# Patient Record
Sex: MALE | Race: WHITE | NOT HISPANIC OR LATINO | Employment: FULL TIME | URBAN - METROPOLITAN AREA
[De-identification: names, ages, dates, MRNs, and addresses within clinical notes are randomized per-mention and may not be internally consistent; named-entity substitution may affect disease eponyms.]

---

## 2023-10-30 PROBLEM — H60.399 ACUTE INFECTIVE OTITIS EXTERNA: Status: ACTIVE | Noted: 2023-06-15

## 2023-10-30 RX ORDER — TIZANIDINE 2 MG/1
1-2 TABLET ORAL 2 TIMES DAILY PRN
COMMUNITY
End: 2024-03-12 | Stop reason: ALTCHOICE

## 2023-10-30 RX ORDER — MELOXICAM 7.5 MG/1
7.5 TABLET ORAL DAILY
COMMUNITY
End: 2024-03-12 | Stop reason: ALTCHOICE

## 2023-10-30 RX ORDER — OXYCODONE HYDROCHLORIDE 5 MG/1
1 TABLET ORAL EVERY 6 HOURS PRN
COMMUNITY
End: 2024-03-12 | Stop reason: ALTCHOICE

## 2023-10-30 RX ORDER — LORATADINE 10 MG/1
10 TABLET ORAL DAILY
COMMUNITY

## 2023-10-31 ENCOUNTER — OFFICE VISIT (OUTPATIENT)
Dept: GASTROENTEROLOGY | Facility: CLINIC | Age: 53
End: 2023-10-31
Payer: COMMERCIAL

## 2023-10-31 VITALS — HEART RATE: 77 BPM | BODY MASS INDEX: 26.48 KG/M2 | WEIGHT: 185 LBS | HEIGHT: 70 IN

## 2023-10-31 DIAGNOSIS — K62.5 BRIGHT RED BLOOD PER RECTUM: ICD-10-CM

## 2023-10-31 DIAGNOSIS — K64.9 HEMORRHOIDS, UNSPECIFIED HEMORRHOID TYPE: ICD-10-CM

## 2023-10-31 DIAGNOSIS — K62.5 RECTAL BLEEDING: Primary | ICD-10-CM

## 2023-10-31 PROCEDURE — 99204 OFFICE O/P NEW MOD 45 MIN: CPT | Performed by: INTERNAL MEDICINE

## 2023-10-31 PROCEDURE — 1036F TOBACCO NON-USER: CPT | Performed by: INTERNAL MEDICINE

## 2023-10-31 RX ORDER — POLYETHYLENE GLYCOL 3350, SODIUM SULFATE ANHYDROUS, SODIUM BICARBONATE, SODIUM CHLORIDE, POTASSIUM CHLORIDE 236; 22.74; 6.74; 5.86; 2.97 G/4L; G/4L; G/4L; G/4L; G/4L
4 POWDER, FOR SOLUTION ORAL ONCE
Qty: 4000 ML | Refills: 0 | Status: SHIPPED | OUTPATIENT
Start: 2023-10-31 | End: 2023-10-31

## 2023-10-31 ASSESSMENT — ENCOUNTER SYMPTOMS: SHORTNESS OF BREATH: 0

## 2023-10-31 NOTE — PROGRESS NOTES
REASON FOR VISIT:  Rectal bleeding  PCP (requesting provider): Kenny Ojeda PA-C.    HPI:  Deshawn Love is a 53 y.o. male being evaluated for rectal bleeding.    The patient reports he has never had a colonoscopy.  He notes that for the last 2-3 weeks that he has had some bright red blood per rectum.  It is a small amount when he passes gas or when he has BM.  No blood on the actual stool.  No prior blood in stool in the past.  It happened for a few days and then recurred.  He has had rectal pain since Sunday and it felt like a hemorrhoid.  He has two Bms per day.  No abdominal pain.  No NSAIDs. No issues with sedation.    PSurgHx:  -Right inguinal hernia repair     FamHx: No GI cancer.    PAST MEDICAL HISTORY  No past medical history on file.    PAST SURGICAL HISTORY  No past surgical history on file.    FAMILY HISTORY  No family history on file.    SOCIAL HISTORY   reports that he has never smoked. He has never used smokeless tobacco. He reports current alcohol use. He reports that he does not use drugs.    REVIEW OF SYSTEMS  Review of Systems   Respiratory:  Negative for shortness of breath.    Cardiovascular:  Negative for chest pain.   All other systems reviewed and are negative.    A 10+ point review of systems was otherwise negative except as noted and per HPI.    ALLERGIES  Allergies   Allergen Reactions    Aspirin Unknown    Sulfamethoxazole-Trimethoprim Rash       MEDICATIONS  Current Outpatient Medications   Medication Instructions    loratadine (CLARITIN) 10 mg, oral, Daily    meloxicam (MOBIC) 7.5 mg, oral, Daily    oxyCODONE (Roxicodone) 5 mg immediate release tablet 1 tablet, oral, Every 6 hours PRN    tiZANidine (ZANAFLEX) 1-2 mg, oral, 2 times daily PRN       VITALS  Vitals:    10/31/23 0852   Pulse: 77      Body mass index is 26.54 kg/m².    PHYSICAL EXAM  CONSTITUTIONAL: NAD, appears stated age  EYES: anicteric sclera, sclera clear  HEAD: normocephalic, atraumatic   NECK: supple   PULMONARY:  "CTAB  CARDIOVASCULAR: RRR, no M/R/G appreciated   ABDOMEN: soft, NTND, +BS, no rebound or guarding   MUSCULOSKELETAL: no edema  SKIN: no jaundice   PSYCHIATRIC: AOx3, appropriate insight and judgement  RECTAL: Greg chaperone, small to medium sized visible and palpable left-sided hemorrhoid, no fissure or fistula, clean vault with no hard palpable mass    LABS  No results found for: \"WBC\", \"HGB\", \"PLT\"  No results found for: \"NA\", \"K\", \"CL\", \"CO2\", \"BUN\", \"CREATININE\", \"CALCIUM\", \"PROT\", \"BILITOT\", \"ALKPHOS\", \"ALT\", \"AST\", \"GLUCOSE\"  No results found for: \"LIPASE\", \"CRP\"    ASSESSMENT/PLAN  Deshawn Love is a 53 y.o. male being evaluated for rectal bleeding. He did have hemorrhoids on rectal exam which is the most likely etiology.  However, he has never had a colonoscopy before and so will pursue this to ensure there is no other more insidious pathology.    -Plan for colonoscopy  -The procedure(s) including risks/benefits, diet restrictions, prep, and sedation were discussed with the patient  -Start Metamucil once daily    Follow-up will be at the time of the colonoscopy.    Signature: Edil Feliciano MD    "

## 2023-10-31 NOTE — PATIENT INSTRUCTIONS
Schedule colonoscopy.    Start Metamucil powder 1 heaping tablespoonful mixed with 8 ounces of juice or water once daily.

## 2023-11-13 ENCOUNTER — OFFICE VISIT (OUTPATIENT)
Dept: GASTROENTEROLOGY | Facility: EXTERNAL LOCATION | Age: 53
End: 2023-11-13
Payer: COMMERCIAL

## 2023-11-13 DIAGNOSIS — K92.1 HEMATOCHEZIA: ICD-10-CM

## 2023-11-13 DIAGNOSIS — K62.5 RECTAL BLEEDING: ICD-10-CM

## 2023-11-13 DIAGNOSIS — K64.4 RESIDUAL HEMORRHOIDAL SKIN TAG: Primary | ICD-10-CM

## 2023-11-13 PROCEDURE — 1036F TOBACCO NON-USER: CPT | Performed by: INTERNAL MEDICINE

## 2023-11-13 PROCEDURE — 45378 DIAGNOSTIC COLONOSCOPY: CPT | Performed by: INTERNAL MEDICINE

## 2023-11-13 NOTE — PROGRESS NOTES
This patient had a procedure(s) done at the [facility].  Please see the procedure note for full details.

## 2024-03-12 ENCOUNTER — OFFICE VISIT (OUTPATIENT)
Dept: PRIMARY CARE | Facility: CLINIC | Age: 54
End: 2024-03-12
Payer: COMMERCIAL

## 2024-03-12 VITALS
HEART RATE: 71 BPM | DIASTOLIC BLOOD PRESSURE: 66 MMHG | TEMPERATURE: 97.8 F | OXYGEN SATURATION: 98 % | SYSTOLIC BLOOD PRESSURE: 114 MMHG

## 2024-03-12 DIAGNOSIS — R05.1 ACUTE COUGH: Primary | ICD-10-CM

## 2024-03-12 DIAGNOSIS — J06.9 UPPER RESPIRATORY TRACT INFECTION, UNSPECIFIED TYPE: ICD-10-CM

## 2024-03-12 PROCEDURE — 1036F TOBACCO NON-USER: CPT | Performed by: PHYSICIAN ASSISTANT

## 2024-03-12 PROCEDURE — 99213 OFFICE O/P EST LOW 20 MIN: CPT | Performed by: PHYSICIAN ASSISTANT

## 2024-03-12 RX ORDER — ALBUTEROL SULFATE 90 UG/1
2 AEROSOL, METERED RESPIRATORY (INHALATION) EVERY 6 HOURS PRN
Qty: 18 G | Refills: 0 | Status: SHIPPED | OUTPATIENT
Start: 2024-03-12 | End: 2025-03-12

## 2024-03-12 NOTE — PROGRESS NOTES
Subjective   Patient ID: Deshawn Love is a 53 y.o. male who presents for Cough (Non-productive cough x3 weeks. ).    HPI   Patient c/o cough. Denies fever, chills, aches, shortness of breath and sore throat.   Present for 3 weeks.  Initially had mild URI symptoms including nasal drainage but that went away after a few days but dry hacking cough has persisted.  Cough: Cough is dry and hacky.  Mainly notices it if he starts talking a lot or laughing.  Denies associated diarrhea, earache and vomiting.  No sneezing.     Taking OTC Nyquil rarely and cough drops.  Uses Claritin.     Patient denies recent known COVID exposure or international travel.      reports that he has never smoked. He has never used smokeless tobacco.    Review of Systems    Objective   /66   Pulse 71   Temp 36.6 °C (97.8 °F)   SpO2 98%     Physical Exam  Vitals and nursing note reviewed.   Constitutional:       Appearance: Normal appearance.   HENT:      Head: Normocephalic.      Right Ear: Tympanic membrane and ear canal normal.      Left Ear: Tympanic membrane and ear canal normal.      Nose: No congestion.      Mouth/Throat:      Mouth: Mucous membranes are moist.      Pharynx: No oropharyngeal exudate or posterior oropharyngeal erythema.   Eyes:      General: No scleral icterus.  Cardiovascular:      Rate and Rhythm: Normal rate and regular rhythm.   Pulmonary:      Effort: Pulmonary effort is normal.      Breath sounds: Normal breath sounds.   Lymphadenopathy:      Cervical: No cervical adenopathy.   Skin:     General: Skin is warm and dry.   Neurological:      Mental Status: He is alert.         Assessment/Plan   Diagnoses and all orders for this visit:  Acute cough  -     albuterol 90 mcg/actuation inhaler; Inhale 2 puffs every 6 hours if needed for wheezing.  Upper respiratory tract infection, unspecified type     Appears to be some RAD follow URI illness.   Rx Albuterol MDI.   Discussed probable viral nature of initial illness.    Can use Mucinex DM.   Encourage fluids and rest.   Follow up if symptoms increase or persist.

## 2024-04-01 ENCOUNTER — OFFICE VISIT (OUTPATIENT)
Dept: PRIMARY CARE | Facility: CLINIC | Age: 54
End: 2024-04-01
Payer: COMMERCIAL

## 2024-04-01 VITALS
OXYGEN SATURATION: 95 % | TEMPERATURE: 97.1 F | SYSTOLIC BLOOD PRESSURE: 122 MMHG | DIASTOLIC BLOOD PRESSURE: 78 MMHG | HEART RATE: 62 BPM

## 2024-04-01 DIAGNOSIS — R05.2 SUBACUTE COUGH: Primary | ICD-10-CM

## 2024-04-01 PROCEDURE — 1036F TOBACCO NON-USER: CPT | Performed by: FAMILY MEDICINE

## 2024-04-01 PROCEDURE — 99214 OFFICE O/P EST MOD 30 MIN: CPT | Performed by: FAMILY MEDICINE

## 2024-04-01 RX ORDER — FLUTICASONE PROPIONATE 50 MCG
1 SPRAY, SUSPENSION (ML) NASAL DAILY
Qty: 16 G | Refills: 0 | Status: SHIPPED | OUTPATIENT
Start: 2024-04-01 | End: 2025-04-01

## 2024-04-01 ASSESSMENT — ENCOUNTER SYMPTOMS
VOMITING: 0
WHEEZING: 0
FEVER: 0
SHORTNESS OF BREATH: 0
COUGH: 1
SORE THROAT: 0
DIARRHEA: 0
CHILLS: 0

## 2024-04-01 NOTE — PROGRESS NOTES
Subjective   Patient ID: Deshawn Love is a 53 y.o. male who presents for Cough (Recheck cough x6 weeks. ).    Deshawn has had a cough for about 6 weeks. Initially thought to be viral and treated with albuterol inhaler. Symptoms improving but having persistent cough. Worse when talking or when lying flat. No wheezing or shortness of breath. No fevers. Does not feel sick other than nagging cough.          Review of Systems   Constitutional:  Negative for chills and fever.   HENT:  Negative for congestion, ear pain and sore throat.    Respiratory:  Positive for cough. Negative for shortness of breath and wheezing.    Cardiovascular:  Negative for chest pain.   Gastrointestinal:  Negative for diarrhea and vomiting.       Objective   /78   Pulse 62   Temp 36.2 °C (97.1 °F)   SpO2 95%     Physical Exam  Constitutional:       General: He is not in acute distress.     Appearance: Normal appearance.   HENT:      Head: Normocephalic.      Mouth/Throat:      Mouth: Mucous membranes are moist.      Comments: Post-nasal drip.  Eyes:      Extraocular Movements: Extraocular movements intact.      Conjunctiva/sclera: Conjunctivae normal.   Cardiovascular:      Rate and Rhythm: Normal rate and regular rhythm.      Heart sounds: No murmur heard.  Pulmonary:      Effort: Pulmonary effort is normal.      Breath sounds: Normal breath sounds. No wheezing, rhonchi or rales.   Musculoskeletal:      Cervical back: Neck supple.   Skin:     General: Skin is warm and dry.   Neurological:      Mental Status: He is alert.   Psychiatric:         Mood and Affect: Mood normal.         Behavior: Behavior normal.         Assessment/Plan   Diagnoses and all orders for this visit:  Subacute cough  Comments:  Cough due to post-nasal drip. Start Flonase. If not improving, plan Medrol dose pack.  Orders:  -     fluticasone (Flonase) 50 mcg/actuation nasal spray; Administer 1 spray into each nostril once daily. Shake gently. Before first use, prime  pump. After use, clean tip and replace cap.

## 2024-04-05 ENCOUNTER — APPOINTMENT (OUTPATIENT)
Dept: PRIMARY CARE | Facility: CLINIC | Age: 54
End: 2024-04-05
Payer: COMMERCIAL

## 2024-04-10 ENCOUNTER — HOSPITAL ENCOUNTER (OUTPATIENT)
Dept: RADIOLOGY | Facility: CLINIC | Age: 54
Discharge: HOME | End: 2024-04-10
Payer: COMMERCIAL

## 2024-04-10 ENCOUNTER — OFFICE VISIT (OUTPATIENT)
Dept: PRIMARY CARE | Facility: CLINIC | Age: 54
End: 2024-04-10
Payer: COMMERCIAL

## 2024-04-10 VITALS
DIASTOLIC BLOOD PRESSURE: 80 MMHG | SYSTOLIC BLOOD PRESSURE: 124 MMHG | TEMPERATURE: 97.3 F | HEART RATE: 86 BPM | OXYGEN SATURATION: 98 %

## 2024-04-10 DIAGNOSIS — J45.21: ICD-10-CM

## 2024-04-10 PROCEDURE — 1036F TOBACCO NON-USER: CPT | Performed by: FAMILY MEDICINE

## 2024-04-10 PROCEDURE — 99213 OFFICE O/P EST LOW 20 MIN: CPT | Performed by: FAMILY MEDICINE

## 2024-04-10 PROCEDURE — 71046 X-RAY EXAM CHEST 2 VIEWS: CPT | Performed by: RADIOLOGY

## 2024-04-10 PROCEDURE — 71046 X-RAY EXAM CHEST 2 VIEWS: CPT

## 2024-04-10 RX ORDER — METHYLPREDNISOLONE 4 MG/1
TABLET ORAL
Qty: 21 TABLET | Refills: 0 | Status: SHIPPED | OUTPATIENT
Start: 2024-04-10 | End: 2024-04-17

## 2024-04-10 RX ORDER — BENZONATATE 100 MG/1
100 CAPSULE ORAL 3 TIMES DAILY PRN
Qty: 42 CAPSULE | Refills: 0 | Status: SHIPPED | OUTPATIENT
Start: 2024-04-10 | End: 2024-05-10

## 2024-04-10 NOTE — PATIENT INSTRUCTIONS
1. Mild intermittent intrinsic asthma without status asthmaticus with acute exacerbation  Cough likely secondary to bronchitis in the setting of asthma exacerbation.  Patient requesting chest x-ray although pneumonia is less likely, chest x-ray was ordered.  Advised patient to start Medrol Dosepak and use Tessalon Perles as needed.  Patient advised to discontinue Mucinex and use albuterol inhaler as needed  - XR chest 2 views; Future  - methylPREDNISolone (Medrol Dospak) 4 mg tablets; Take as directed on package.  Dispense: 21 tablet; Refill: 0  - benzonatate (Tessalon) 100 mg capsule; Take 1 capsule (100 mg) by mouth 3 times a day as needed for cough. Do not crush or chew.  Dispense: 42 capsule; Refill: 0

## 2024-04-10 NOTE — PROGRESS NOTES
Assessment/Plan   ASSESSMENT/PLAN:      Patient Instructions   1. Mild intermittent intrinsic asthma without status asthmaticus with acute exacerbation  Cough likely secondary to bronchitis in the setting of asthma exacerbation.  Patient requesting chest x-ray although pneumonia is less likely, chest x-ray was ordered.  Advised patient to start Medrol Dosepak and use Tessalon Perles as needed.  Patient advised to discontinue Mucinex and use albuterol inhaler as needed  - XR chest 2 views; Future  - methylPREDNISolone (Medrol Dospak) 4 mg tablets; Take as directed on package.  Dispense: 21 tablet; Refill: 0  - benzonatate (Tessalon) 100 mg capsule; Take 1 capsule (100 mg) by mouth 3 times a day as needed for cough. Do not crush or chew.  Dispense: 42 capsule; Refill: 0         Eboselumhen Iseghohi, DO     FUTURE DIRECTION:     Subjective   SUBJECTIVE:     Patient ID: Deshawn Love is a 53 y.o. malefor the following:    Chief Complaint   Patient presents with    Cough     Recheck cough x 7 weeks     Ongoing for the past 7 weeks   Feels like symptoms has been getting worse especially with conversation and drinking water   Has been taking albuterol inhaler,Flonase, and mucinex DM without improvement   Associated with mild headache but denies fever or chills   Described as mostly non productive           Review of Systems    Allergies   Allergen Reactions    Aspirin Unknown    Sulfamethoxazole-Trimethoprim Rash         Current Outpatient Medications:     albuterol 90 mcg/actuation inhaler, Inhale 2 puffs every 6 hours if needed for wheezing., Disp: 18 g, Rfl: 0    fluticasone (Flonase) 50 mcg/actuation nasal spray, Administer 1 spray into each nostril once daily. Shake gently. Before first use, prime pump. After use, clean tip and replace cap., Disp: 16 g, Rfl: 0    loratadine (Claritin) 10 mg tablet, Take 1 tablet (10 mg) by mouth once daily., Disp: , Rfl:     benzonatate (Tessalon) 100 mg capsule, Take 1 capsule (100  mg) by mouth 3 times a day as needed for cough. Do not crush or chew., Disp: 42 capsule, Rfl: 0    methylPREDNISolone (Medrol Dospak) 4 mg tablets, Take as directed on package., Disp: 21 tablet, Rfl: 0     Patient Active Problem List   Diagnosis    Acute infective otitis externa    Intrinsic asthma without status asthmaticus       Social History     Socioeconomic History    Marital status:      Spouse name: Not on file    Number of children: Not on file    Years of education: Not on file    Highest education level: Not on file   Occupational History    Not on file   Tobacco Use    Smoking status: Never    Smokeless tobacco: Never   Vaping Use    Vaping status: Never Used   Substance and Sexual Activity    Alcohol use: Not Currently    Drug use: Never    Sexual activity: Not on file   Other Topics Concern    Not on file   Social History Narrative    Not on file     Social Determinants of Health     Financial Resource Strain: Not on file   Food Insecurity: Not on file   Transportation Needs: Not on file   Physical Activity: Not on file   Stress: Not on file   Social Connections: Not on file   Intimate Partner Violence: Not on file   Housing Stability: Not on file       Objective   OBJECTIVE:     Vitals:    04/10/24 0925   BP: 124/80   Pulse: 86   Temp: 36.3 °C (97.3 °F)   SpO2: 98%       Exam      Physical Exam  Constitutional:       Appearance: Normal appearance.   HENT:      Head: Normocephalic.      Right Ear: Tympanic membrane normal.      Left Ear: Tympanic membrane normal.      Nose: Nose normal.      Mouth/Throat:      Mouth: Mucous membranes are moist.      Pharynx: No posterior oropharyngeal erythema.   Cardiovascular:      Rate and Rhythm: Normal rate.   Pulmonary:      Effort: Pulmonary effort is normal.      Breath sounds: Wheezing present.   Musculoskeletal:      Cervical back: Normal range of motion.   Neurological:      Mental Status: He is alert.   Psychiatric:         Mood and Affect: Mood  normal.           No results found for this or any previous visit.

## 2024-04-11 ENCOUNTER — TELEPHONE (OUTPATIENT)
Dept: PRIMARY CARE | Facility: CLINIC | Age: 54
End: 2024-04-11
Payer: COMMERCIAL

## 2024-04-11 NOTE — TELEPHONE ENCOUNTER
----- Message from Imelda Matthew DO sent at 4/11/2024  4:07 PM EDT -----  Please let pt know that Xray is normal

## 2024-07-08 ENCOUNTER — APPOINTMENT (OUTPATIENT)
Dept: PRIMARY CARE | Facility: CLINIC | Age: 54
End: 2024-07-08
Payer: COMMERCIAL

## 2024-07-08 ENCOUNTER — LAB (OUTPATIENT)
Dept: LAB | Facility: LAB | Age: 54
End: 2024-07-08
Payer: COMMERCIAL

## 2024-07-08 VITALS
HEART RATE: 72 BPM | OXYGEN SATURATION: 97 % | SYSTOLIC BLOOD PRESSURE: 112 MMHG | HEIGHT: 70 IN | DIASTOLIC BLOOD PRESSURE: 70 MMHG | TEMPERATURE: 97 F | BODY MASS INDEX: 26.2 KG/M2 | WEIGHT: 183 LBS

## 2024-07-08 DIAGNOSIS — R35.1 NOCTURIA: ICD-10-CM

## 2024-07-08 DIAGNOSIS — Z00.00 ROUTINE GENERAL MEDICAL EXAMINATION AT A HEALTH CARE FACILITY: ICD-10-CM

## 2024-07-08 DIAGNOSIS — Z12.5 SCREENING FOR MALIGNANT NEOPLASM OF PROSTATE: ICD-10-CM

## 2024-07-08 DIAGNOSIS — Z00.00 ROUTINE GENERAL MEDICAL EXAMINATION AT A HEALTH CARE FACILITY: Primary | ICD-10-CM

## 2024-07-08 LAB
ANION GAP SERPL CALC-SCNC: 13 MMOL/L (ref 10–20)
BUN SERPL-MCNC: 17 MG/DL (ref 6–23)
CALCIUM SERPL-MCNC: 9.4 MG/DL (ref 8.6–10.3)
CHLORIDE SERPL-SCNC: 101 MMOL/L (ref 98–107)
CHOLEST SERPL-MCNC: 203 MG/DL (ref 0–199)
CHOLESTEROL/HDL RATIO: 3.7
CO2 SERPL-SCNC: 29 MMOL/L (ref 21–32)
CREAT SERPL-MCNC: 1.48 MG/DL (ref 0.5–1.3)
EGFRCR SERPLBLD CKD-EPI 2021: 55 ML/MIN/1.73M*2
GLUCOSE SERPL-MCNC: 81 MG/DL (ref 74–99)
HDLC SERPL-MCNC: 54.8 MG/DL
LDLC SERPL CALC-MCNC: 127 MG/DL
NON HDL CHOLESTEROL: 148 MG/DL (ref 0–149)
POTASSIUM SERPL-SCNC: 4.3 MMOL/L (ref 3.5–5.3)
PSA SERPL-MCNC: 1.6 NG/ML
SODIUM SERPL-SCNC: 139 MMOL/L (ref 136–145)
TRIGL SERPL-MCNC: 105 MG/DL (ref 0–149)
VLDL: 21 MG/DL (ref 0–40)

## 2024-07-08 PROCEDURE — 1036F TOBACCO NON-USER: CPT | Performed by: PHYSICIAN ASSISTANT

## 2024-07-08 PROCEDURE — 80048 BASIC METABOLIC PNL TOTAL CA: CPT

## 2024-07-08 PROCEDURE — 84153 ASSAY OF PSA TOTAL: CPT

## 2024-07-08 PROCEDURE — 99396 PREV VISIT EST AGE 40-64: CPT | Performed by: PHYSICIAN ASSISTANT

## 2024-07-08 PROCEDURE — 80061 LIPID PANEL: CPT

## 2024-07-08 PROCEDURE — 36415 COLL VENOUS BLD VENIPUNCTURE: CPT

## 2024-07-08 RX ORDER — TAMSULOSIN HYDROCHLORIDE 0.4 MG/1
0.4 CAPSULE ORAL DAILY
Qty: 30 CAPSULE | Refills: 2 | Status: SHIPPED | OUTPATIENT
Start: 2024-07-08

## 2024-07-08 RX ORDER — MELOXICAM 7.5 MG/1
7.5 TABLET ORAL
COMMUNITY
Start: 2024-06-25

## 2024-07-08 ASSESSMENT — ENCOUNTER SYMPTOMS
SHORTNESS OF BREATH: 0
CONSTIPATION: 0
FATIGUE: 0
BLOOD IN STOOL: 0
EYE PAIN: 0
VOMITING: 0
PALPITATIONS: 0
ABDOMINAL PAIN: 0
HEADACHES: 0
DIZZINESS: 0
NAUSEA: 0
NERVOUS/ANXIOUS: 0
MYALGIAS: 0
DYSURIA: 0
HEMATURIA: 0
DYSPHORIC MOOD: 0
SORE THROAT: 0
ARTHRALGIAS: 0
DIARRHEA: 0

## 2024-07-08 ASSESSMENT — PROMIS GLOBAL HEALTH SCALE
EMOTIONAL_PROBLEMS: RARELY
RATE_AVERAGE_PAIN: 1
RATE_GENERAL_HEALTH: VERY GOOD
CARRYOUT_SOCIAL_ACTIVITIES: EXCELLENT
RATE_MENTAL_HEALTH: VERY GOOD
RATE_QUALITY_OF_LIFE: VERY GOOD
RATE_AVERAGE_FATIGUE: MILD
RATE_SOCIAL_SATISFACTION: EXCELLENT
CARRYOUT_PHYSICAL_ACTIVITIES: COMPLETELY
RATE_PHYSICAL_HEALTH: VERY GOOD

## 2024-07-08 NOTE — PROGRESS NOTES
Subjective   Patient ID: Deshawn Love is a 54 y.o. male who presents for Annual Exam (Cpe.).    HPI   Here for CPE.     Doing elliptical 2-3 times per week plus some wts.     Working with Dr Webb for chronic knee pain. Has gotten injections. They have worked well.     Had colonoscopy 11/13/23 with Dr Feliciano that was normal. Repeat in 2033.     Nocturia 2-3 times per night increasing over the past couple years.  Some decreased flow at times.     Review of Systems   Constitutional:  Negative for fatigue.   HENT:  Negative for congestion, ear pain and sore throat.    Eyes:  Negative for pain and visual disturbance.   Respiratory:  Negative for shortness of breath.    Cardiovascular:  Negative for chest pain and palpitations.   Gastrointestinal:  Negative for abdominal pain, blood in stool, constipation, diarrhea, nausea and vomiting.   Genitourinary:  Negative for dysuria, hematuria and testicular pain.   Musculoskeletal:  Negative for arthralgias and myalgias.   Skin:  Negative for rash.   Neurological:  Negative for dizziness and headaches.   Psychiatric/Behavioral:  Negative for dysphoric mood. The patient is not nervous/anxious.      Past Medical History:   Diagnosis Date    Anxiety     Asthma ([facility])     mild, intermittent (seasonal)      Family History   Problem Relation Name Age of Onset    Diabetes type II Mother      Hypothyroidism Mother      Hypertension Father      Hypothyroidism Sister      Diabetes type II Brother      Alzheimer's disease Maternal Grandmother      Heart disease Maternal Grandfather      Heart disease Paternal Grandmother      Diabetes Paternal Grandmother      Heart disease Paternal Grandfather        Social History     Tobacco Use    Smoking status: Never    Smokeless tobacco: Never   Vaping Use    Vaping status: Never Used   Substance Use Topics    Alcohol use: Yes     Alcohol/week: 3.0 standard drinks of alcohol     Types: 3 Shots of liquor per week    Drug use: Never     "      Objective   /70   Pulse 72   Temp 36.1 °C (97 °F)   Ht 1.765 m (5' 9.5\")   Wt 83 kg (183 lb)   SpO2 97%   BMI 26.64 kg/m²     Physical Exam  Vitals and nursing note reviewed.   Constitutional:       Appearance: Normal appearance. He is well-developed.   HENT:      Head: Normocephalic.      Right Ear: Tympanic membrane, ear canal and external ear normal.      Left Ear: Tympanic membrane, ear canal and external ear normal.      Nose: Nose normal.      Mouth/Throat:      Mouth: Mucous membranes are moist.      Pharynx: Oropharynx is clear.   Eyes:      General: No scleral icterus.     Extraocular Movements: Extraocular movements intact.      Pupils: Pupils are equal, round, and reactive to light.   Cardiovascular:      Rate and Rhythm: Normal rate and regular rhythm.      Heart sounds: No murmur heard.  Pulmonary:      Effort: Pulmonary effort is normal.      Breath sounds: Normal breath sounds.   Abdominal:      Palpations: Abdomen is soft. There is no mass.      Tenderness: There is no abdominal tenderness.   Musculoskeletal:      Cervical back: Neck supple.   Lymphadenopathy:      Cervical: No cervical adenopathy.   Skin:     General: Skin is warm and dry.   Neurological:      General: No focal deficit present.      Mental Status: He is alert.      Gait: Gait normal.   Psychiatric:         Mood and Affect: Mood normal.         Behavior: Behavior normal.         Assessment/Plan   Diagnoses and all orders for this visit:  Routine general medical examination at a health care facility  -     Lipid Panel; Future  -     Basic Metabolic Panel; Future  Screening for malignant neoplasm of prostate  -     Prostate Specific Antigen; Future  Nocturia  -     tamsulosin (Flomax) 0.4 mg 24 hr capsule; Take 1 capsule (0.4 mg) by mouth once daily.       Discussed wellness issues.  Get discussed diet and exercise.  Get fasting labs including PSA.  Discussed probable BPH and discussed treatment options.  Rx Flomax " 0.4 mg daily.  Follow-up in 3 months for recheck nocturia, earlier if needed.

## 2024-08-05 ENCOUNTER — TELEMEDICINE (OUTPATIENT)
Dept: PRIMARY CARE | Facility: CLINIC | Age: 54
End: 2024-08-05
Payer: COMMERCIAL

## 2024-08-05 DIAGNOSIS — R42 VERTIGO: ICD-10-CM

## 2024-08-05 DIAGNOSIS — J01.40 ACUTE PANSINUSITIS, RECURRENCE NOT SPECIFIED: Primary | ICD-10-CM

## 2024-08-05 PROCEDURE — 99213 OFFICE O/P EST LOW 20 MIN: CPT

## 2024-08-05 PROCEDURE — 1036F TOBACCO NON-USER: CPT

## 2024-08-05 RX ORDER — AMOXICILLIN AND CLAVULANATE POTASSIUM 875; 125 MG/1; MG/1
875 TABLET, FILM COATED ORAL 2 TIMES DAILY
Qty: 14 TABLET | Refills: 0 | Status: SHIPPED | OUTPATIENT
Start: 2024-08-05 | End: 2024-08-12

## 2024-08-05 RX ORDER — ONDANSETRON 4 MG/1
4 TABLET, FILM COATED ORAL EVERY 8 HOURS PRN
Qty: 6 TABLET | Refills: 0 | Status: SHIPPED | OUTPATIENT
Start: 2024-08-05

## 2024-08-05 ASSESSMENT — ENCOUNTER SYMPTOMS
DIZZINESS: 1
WEAKNESS: 0
SHORTNESS OF BREATH: 0
SINUS PAIN: 1
CHILLS: 0
LIGHT-HEADEDNESS: 0
CONSTIPATION: 0
CHANGE IN BOWEL HABIT: 0
DIARRHEA: 0
HEADACHES: 0
VOMITING: 1
VERTIGO: 1
FEVER: 0
NAUSEA: 1
ABDOMINAL PAIN: 0
SORE THROAT: 0
VISUAL CHANGE: 0

## 2024-08-05 NOTE — PROGRESS NOTES
Subjective   Patient ID: Deshawn Love is a 54 y.o. male who presents for sinus congestion, dizziness, vomiting.     With patient's permission, this is a Telemedicine visit with video and audio. Provider located at office address. Patient located at their home address. All issues as documented below were discussed and addressed but limited physical exam was performed. If it was felt that the patient should be evaluated via face-to-face office appointment(s) they were directed to appropriate location.     Dizziness  This is a new problem. The current episode started today. Associated symptoms include congestion (2 weeks), nausea, vertigo and vomiting (1 episode). Pertinent negatives include no abdominal pain, change in bowel habit, chest pain, chills, fever, headaches, sore throat, visual change or weakness. Exacerbated by: Lying down. He has tried position changes (as well as Flonase, decongestant) for the symptoms. The treatment provided mild relief.        Review of Systems   Constitutional:  Negative for chills and fever.   HENT:  Positive for congestion (2 weeks), ear pain (fullness) and sinus pain. Negative for sore throat.    Respiratory:  Negative for shortness of breath.    Cardiovascular:  Negative for chest pain.   Gastrointestinal:  Positive for nausea and vomiting (1 episode). Negative for abdominal pain, change in bowel habit, constipation and diarrhea.   Neurological:  Positive for dizziness and vertigo. Negative for weakness, light-headedness and headaches.     Objective   There were no vitals taken for this visit.    Physical Exam  Not performed due to limitations virtual telemedicine encounter.     Assessment/Plan   Problem List Items Addressed This Visit    None  Visit Diagnoses         Codes    Acute pansinusitis, recurrence not specified    -  Primary  Acute.   Augmentin as directed. Risks and benefits of medication discussed and prescribed.   OTC Tylenol as directed for headaches, sinus pressure.  OTC Flonase as directed for sinus congestion and ear fullness. OTC antihistamine as directed for sinus drainage. Increased fluids, rest, humidifier.   Follow up with PCP if symptoms do not improve within 7-10 days, or sooner for worsening.  J01.40    Relevant Medications    amoxicillin-pot clavulanate (Augmentin) 875-125 mg tablet    Vertigo    Acute, suspect secondary to increased sinus pressure and eustachian tube dysfunction.    OTC Flonase as directed for sinus congestion and ear fullness. Increased fluid intake.   Zofran as needed for nausea, vomiting. Risks and benefits of medication discussed and prescribed.   Follow up with PCP if symptoms do not improve within 7-10 days, or sooner for worsening.  R42    Relevant Medications    ondansetron (Zofran) 4 mg tablet

## 2024-09-30 DIAGNOSIS — R94.4 ABNORMAL RENAL FUNCTION TEST: ICD-10-CM

## 2024-09-30 DIAGNOSIS — E78.5 HYPERLIPIDEMIA, UNSPECIFIED HYPERLIPIDEMIA TYPE: Primary | ICD-10-CM

## 2024-10-02 ENCOUNTER — LAB (OUTPATIENT)
Dept: LAB | Facility: LAB | Age: 54
End: 2024-10-02
Payer: COMMERCIAL

## 2024-10-02 DIAGNOSIS — E78.5 HYPERLIPIDEMIA, UNSPECIFIED HYPERLIPIDEMIA TYPE: ICD-10-CM

## 2024-10-02 DIAGNOSIS — R94.4 ABNORMAL RENAL FUNCTION TEST: ICD-10-CM

## 2024-10-02 LAB
ALBUMIN SERPL BCP-MCNC: 4.3 G/DL (ref 3.4–5)
ALP SERPL-CCNC: 66 U/L (ref 33–120)
ALT SERPL W P-5'-P-CCNC: 19 U/L (ref 10–52)
ANION GAP SERPL CALC-SCNC: 11 MMOL/L (ref 10–20)
AST SERPL W P-5'-P-CCNC: 18 U/L (ref 9–39)
BILIRUB SERPL-MCNC: 1 MG/DL (ref 0–1.2)
BUN SERPL-MCNC: 18 MG/DL (ref 6–23)
CALCIUM SERPL-MCNC: 8.9 MG/DL (ref 8.6–10.3)
CHLORIDE SERPL-SCNC: 103 MMOL/L (ref 98–107)
CHOLEST SERPL-MCNC: 173 MG/DL (ref 0–199)
CHOLESTEROL/HDL RATIO: 3.6
CO2 SERPL-SCNC: 29 MMOL/L (ref 21–32)
CREAT SERPL-MCNC: 1.31 MG/DL (ref 0.5–1.3)
EGFRCR SERPLBLD CKD-EPI 2021: 64 ML/MIN/1.73M*2
GLUCOSE SERPL-MCNC: 96 MG/DL (ref 74–99)
HDLC SERPL-MCNC: 47.6 MG/DL
LDLC SERPL CALC-MCNC: 107 MG/DL
NON HDL CHOLESTEROL: 125 MG/DL (ref 0–149)
POTASSIUM SERPL-SCNC: 4.6 MMOL/L (ref 3.5–5.3)
PROT SERPL-MCNC: 6.9 G/DL (ref 6.4–8.2)
SODIUM SERPL-SCNC: 138 MMOL/L (ref 136–145)
TRIGL SERPL-MCNC: 91 MG/DL (ref 0–149)
VLDL: 18 MG/DL (ref 0–40)

## 2024-10-02 PROCEDURE — 80061 LIPID PANEL: CPT

## 2024-10-02 PROCEDURE — 36415 COLL VENOUS BLD VENIPUNCTURE: CPT

## 2024-10-02 PROCEDURE — 80053 COMPREHEN METABOLIC PANEL: CPT

## 2024-10-07 ENCOUNTER — APPOINTMENT (OUTPATIENT)
Dept: PRIMARY CARE | Facility: CLINIC | Age: 54
End: 2024-10-07
Payer: COMMERCIAL

## 2024-10-07 VITALS
OXYGEN SATURATION: 99 % | BODY MASS INDEX: 27.51 KG/M2 | HEART RATE: 67 BPM | DIASTOLIC BLOOD PRESSURE: 72 MMHG | WEIGHT: 189 LBS | TEMPERATURE: 97.7 F | SYSTOLIC BLOOD PRESSURE: 118 MMHG

## 2024-10-07 DIAGNOSIS — R35.1 BPH ASSOCIATED WITH NOCTURIA: Primary | ICD-10-CM

## 2024-10-07 DIAGNOSIS — N40.1 BPH ASSOCIATED WITH NOCTURIA: Primary | ICD-10-CM

## 2024-10-07 DIAGNOSIS — R35.1 NOCTURIA: ICD-10-CM

## 2024-10-07 DIAGNOSIS — Z82.49 FAMILY HISTORY OF CORONARY ARTERY DISEASE: ICD-10-CM

## 2024-10-07 DIAGNOSIS — E78.5 HYPERLIPIDEMIA, UNSPECIFIED HYPERLIPIDEMIA TYPE: ICD-10-CM

## 2024-10-07 PROCEDURE — 1036F TOBACCO NON-USER: CPT | Performed by: PHYSICIAN ASSISTANT

## 2024-10-07 PROCEDURE — 99214 OFFICE O/P EST MOD 30 MIN: CPT | Performed by: PHYSICIAN ASSISTANT

## 2024-10-07 RX ORDER — TAMSULOSIN HYDROCHLORIDE 0.4 MG/1
0.8 CAPSULE ORAL DAILY
Qty: 180 CAPSULE | Refills: 1 | Status: SHIPPED | OUTPATIENT
Start: 2024-10-07

## 2024-10-07 ASSESSMENT — ENCOUNTER SYMPTOMS: SHORTNESS OF BREATH: 0

## 2024-10-07 NOTE — PROGRESS NOTES
"Subjective   Patient ID: Deshawn Love is a 54 y.o. male who presents for Benign Prostatic Hypertrophy (Recheck) and Nocturia (Review BW).    HPI   Recheck of nocturia and BPH.   Started Flomax after last visit and tolerating med well.   Nocturia improved. Increased urinary stream.   Was having nocturia 2-3 times per night and now going once and sometimes twice at night.      Lab Results   Component Value Date    PSA 1.60 07/08/2024       Hyperlipidemia: LDL improving. Has been working on diet. Cut     LDL Calculated (mg/dL)   Date Value   10/02/2024 107 (H)   07/08/2024 127 (H)   No results found for: \"LDLF\"  Triglycerides (mg/dL)   Date Value   10/02/2024 91   07/08/2024 105     HDL-Cholesterol (mg/dL)   Date Value   10/02/2024 47.6   07/08/2024 54.8         Renal function improved on recent labs.  Hydrating better with lab  Lab Results   Component Value Date    CREATININE 1.31 (H) 10/02/2024    CREATININE 1.48 (H) 07/08/2024       Review of Systems   Respiratory:  Negative for shortness of breath.    Cardiovascular:  Negative for chest pain.         Lab Results   Component Value Date    PSA 1.60 07/08/2024       Hyperlipidemia: LDL improving. Has been working on diet. Cut out egg yolks. Exercising 3 times per week.    LDL Calculated (mg/dL)   Date Value   10/02/2024 107 (H)   07/08/2024 127 (H)   No results found for: \"LDLF\"  Triglycerides (mg/dL)   Date Value   10/02/2024 91   07/08/2024 105     HDL-Cholesterol (mg/dL)   Date Value   10/02/2024 47.6   07/08/2024 54.8       Renal function improved on recent labs.  Hydrating better with lab  Lab Results   Component Value Date    CREATININE 1.31 (H) 10/02/2024    CREATININE 1.48 (H) 07/08/2024       Past Medical History:   Diagnosis Date    Anxiety     Asthma     mild, intermittent (seasonal)    BPH associated with nocturia     Hyperlipidemia       Family History   Problem Relation Name Age of Onset    Diabetes type II Mother      Hypothyroidism Mother      " Hypertension Father      Hypothyroidism Sister      Diabetes type II Brother      Alzheimer's disease Maternal Grandmother      Heart disease Maternal Grandfather      Heart disease Paternal Grandmother      Diabetes Paternal Grandmother      Heart disease Paternal Grandfather        Social History     Tobacco Use    Smoking status: Never    Smokeless tobacco: Never   Vaping Use    Vaping status: Never Used   Substance Use Topics    Alcohol use: Yes     Alcohol/week: 3.0 standard drinks of alcohol     Types: 3 Shots of liquor per week    Drug use: Never              Objective   /72   Pulse 67   Temp 36.5 °C (97.7 °F)   Wt 85.7 kg (189 lb)   SpO2 99%   BMI 27.51 kg/m²     Physical Exam  Vitals and nursing note reviewed.   HENT:      Head: Normocephalic.   Eyes:      General: No scleral icterus.  Cardiovascular:      Rate and Rhythm: Normal rate and regular rhythm.   Pulmonary:      Effort: Pulmonary effort is normal.      Breath sounds: Normal breath sounds.   Abdominal:      Palpations: Abdomen is soft. There is no mass.      Tenderness: There is no abdominal tenderness.   Skin:     General: Skin is warm and dry.   Neurological:      Mental Status: He is alert.   Psychiatric:         Mood and Affect: Affect normal.         Assessment/Plan   Diagnoses and all orders for this visit:  BPH associated with nocturia  Hyperlipidemia, unspecified hyperlipidemia type  -     CT cardiac scoring wo IV contrast; Future  -     Basic Metabolic Panel; Future  -     Lipid Panel; Future  Nocturia  -     tamsulosin (Flomax) 0.4 mg 24 hr capsule; Take 2 capsules (0.8 mg) by mouth once daily.  -     Basic Metabolic Panel; Future  Family history of coronary artery disease  -     CT cardiac scoring wo IV contrast; Future  -     Basic Metabolic Panel; Future  -     Lipid Panel; Future       Reviewed labs.   Due to hyperlipidemia and family hx of CAD, will get CT cardiac calcium score.   Discussed diet and exercise.   Increase to  Rx Tamsulosin 0.4 mg 2 po every day.   Recheck BPH and hyperlipidemia in 6 months with fasting labs the week before.

## 2025-01-11 ENCOUNTER — OFFICE VISIT (OUTPATIENT)
Dept: URGENT CARE | Age: 55
End: 2025-01-11
Payer: COMMERCIAL

## 2025-01-11 VITALS
TEMPERATURE: 98.3 F | SYSTOLIC BLOOD PRESSURE: 132 MMHG | HEART RATE: 75 BPM | DIASTOLIC BLOOD PRESSURE: 88 MMHG | OXYGEN SATURATION: 97 % | RESPIRATION RATE: 20 BRPM

## 2025-01-11 DIAGNOSIS — S01.01XA SCALP LACERATION, INITIAL ENCOUNTER: Primary | ICD-10-CM

## 2025-01-11 ASSESSMENT — ENCOUNTER SYMPTOMS
WOUND: 1
RESPIRATORY NEGATIVE: 1
MUSCULOSKELETAL NEGATIVE: 1
PSYCHIATRIC NEGATIVE: 1
CARDIOVASCULAR NEGATIVE: 1
CONSTITUTIONAL NEGATIVE: 1
NEUROLOGICAL NEGATIVE: 1

## 2025-01-11 NOTE — PROGRESS NOTES
Subjective   Patient ID: Deshawn Love is a 54 y.o. male. They present today with a chief complaint of Laceration (Hit side of head into metal today, laceration).  Hit head on nail in attic while putting away tanisha decorations. Up to date on tetanus. Minimal pain, and bleeding, no signs of infection      Laceration      Past Medical History  Allergies as of 01/11/2025 - Reviewed 01/11/2025   Allergen Reaction Noted    Aspirin Unknown 10/30/2023    Sulfamethoxazole-trimethoprim Rash 10/30/2023       (Not in a hospital admission)       Past Medical History:   Diagnosis Date    Anxiety     Asthma     mild, intermittent (seasonal)    BPH associated with nocturia     Hyperlipidemia        Past Surgical History:   Procedure Laterality Date    CLAVICLE SURGERY  2001    FRACTURE SURGERY  2001    HERNIA REPAIR  1973    inguinal    HERNIA REPAIR  01/2023    LASIK          reports that he has never smoked. He has never used smokeless tobacco. He reports current alcohol use of about 3.0 standard drinks of alcohol per week. He reports that he does not use drugs.    Review of Systems  Review of Systems   Constitutional: Negative.    Respiratory: Negative.     Cardiovascular: Negative.    Musculoskeletal: Negative.    Skin:  Positive for wound.   Neurological: Negative.    Psychiatric/Behavioral: Negative.                                    Objective    Vitals:    01/11/25 1047   BP: 132/88   Pulse: 75   Resp: 20   Temp: 36.8 °C (98.3 °F)   SpO2: 97%     No LMP for male patient.    Physical Exam  Constitutional:       Appearance: Normal appearance.   Cardiovascular:      Rate and Rhythm: Normal rate.      Pulses: Normal pulses.   Pulmonary:      Effort: Pulmonary effort is normal.   Musculoskeletal:         General: Normal range of motion.   Skin:     General: Skin is warm and dry.      Comments: L shaped laceration left scalp, 1.5cm   Neurological:      Mental Status: He is alert.         Laceration Repair    Date/Time:  1/11/2025 11:05 AM    Performed by: VIELKA Varela  Authorized by: VIELKA Varela    Consent:     Consent obtained:  Verbal and written    Consent given by:  Patient    Risks, benefits, and alternatives were discussed: yes      Risks discussed:  Infection, pain, retained foreign body, need for additional repair, poor cosmetic result, nerve damage, poor wound healing and vascular damage  Universal protocol:     Procedure explained and questions answered to patient or proxy's satisfaction: yes      Relevant documents present and verified: yes      Patient identity confirmed:  Verbally with patient  Anesthesia:     Anesthesia method:  None  Laceration details:     Location:  Scalp    Length (cm):  1.5  Exploration:     Hemostasis achieved with:  Direct pressure    Wound extent: fascia violated      Wound extent: no foreign bodies/material noted      Contaminated: no    Treatment:     Area cleansed with:  Saline and chlorhexidine    Amount of cleaning:  Standard    Irrigation solution:  Sterile saline    Irrigation volume:  10 ml    Irrigation method:  Pressure wash    Debridement:  None    Undermining:  None  Skin repair:     Repair method:  Staples    Number of staples:  2  Approximation:     Approximation:  Close  Repair type:     Repair type:  Simple  Post-procedure details:     Dressing:  Adhesive bandage    Procedure completion:  Tolerated well, no immediate complications      Point of Care Test & Imaging Results from this visit  No results found for this visit on 01/11/25.   No results found.    Diagnostic study results (if any) were reviewed by VIELKA Varela.    Assessment/Plan   Allergies, medications, history, and pertinent labs/EKGs/Imaging reviewed by VIELKA Varela.     Medical Decision Making  Simple laceration repaired with 2 staples  Staples can be removed in 5-7 days. Keep wound clean with soap and water.   Can take Acetaminophen/motrin/ice for pain  Follow up  with any signs of infection: Redness, drainage, swelling, worsening pain, fever, chills  Up to date on tetanus    Orders and Diagnoses  Diagnoses and all orders for this visit:  Scalp laceration, initial encounter  Other orders  -     Laceration Repair      Medical Admin Record      Patient disposition: Home    Electronically signed by VIELKA Varela  11:14 AM

## 2025-01-11 NOTE — PATIENT INSTRUCTIONS
Simple laceration repaired with 2 staples  Staples can be removed in 5-7 days. Keep wound clean with soap and water.   Can take Acetaminophen/motrin/ice for pain  Follow up with any signs of infection: Redness, drainage, swelling, worsening pain, fever, chills  Up to date on tetanus

## 2025-01-17 ENCOUNTER — OFFICE VISIT (OUTPATIENT)
Dept: URGENT CARE | Age: 55
End: 2025-01-17

## 2025-01-17 VITALS
TEMPERATURE: 98.3 F | HEART RATE: 86 BPM | OXYGEN SATURATION: 95 % | SYSTOLIC BLOOD PRESSURE: 127 MMHG | RESPIRATION RATE: 18 BRPM | DIASTOLIC BLOOD PRESSURE: 83 MMHG

## 2025-01-17 DIAGNOSIS — Z48.02 VISIT FOR SUTURE REMOVAL: Primary | ICD-10-CM

## 2025-01-17 DIAGNOSIS — S01.01XA SCALP LACERATION, INITIAL ENCOUNTER: ICD-10-CM

## 2025-01-17 ASSESSMENT — ENCOUNTER SYMPTOMS: WOUND: 1

## 2025-01-17 NOTE — PROGRESS NOTES
Subjective   Patient ID: Deshawn Love is a 54 y.o. male. They present today with a chief complaint of Suture / Staple Removal (Staples put in a few days ago states pt was told come back today for removal ).    History of Present Illness  Patient presents for staple removal for staples placed 6 days ago. Denies any complications including swelling, pain, discharge, drainage. Staples placed in our clinic.       Suture / Staple Removal         Past Medical History  Allergies as of 01/17/2025 - Reviewed 01/17/2025   Allergen Reaction Noted    Aspirin Unknown 10/30/2023    Sulfamethoxazole-trimethoprim Rash 10/30/2023       (Not in a hospital admission)       Past Medical History:   Diagnosis Date    Anxiety     Asthma     mild, intermittent (seasonal)    BPH associated with nocturia     Hyperlipidemia        Past Surgical History:   Procedure Laterality Date    CLAVICLE SURGERY  2001    FRACTURE SURGERY  2001    HERNIA REPAIR  1973    inguinal    HERNIA REPAIR  01/2023    LASIK          reports that he has never smoked. He has never used smokeless tobacco. He reports current alcohol use of about 3.0 standard drinks of alcohol per week. He reports that he does not use drugs.    Review of Systems  Review of Systems   Skin:  Positive for wound.                                  Objective    Vitals:    01/17/25 1357   BP: 127/83   BP Location: Right arm   Patient Position: Sitting   BP Cuff Size: Adult   Pulse: 86   Resp: 18   Temp: 36.8 °C (98.3 °F)   TempSrc: Oral   SpO2: 95%     No LMP for male patient.    Physical Exam  Skin:     Comments: Left scalp with closed laceration, 2 sutures in place.          Suture Removal    Date/Time: 1/17/2025 2:06 PM    Performed by: Kayla Malik PA-C  Authorized by: Kayla Malik PA-C    Consent:     Consent obtained:  Verbal    Risks discussed:  Bleeding and pain  Location:     Location:  Head/neck    Head/neck location:  Scalp  Procedure details:     Wound appearance:   No signs of infection and good wound healing    Number of staples removed:  2  Post-procedure details:     Post-removal:  Antibiotic ointment applied      Point of Care Test & Imaging Results from this visit  No results found for this visit on 01/17/25.   No results found.    Diagnostic study results (if any) were reviewed by Kayla Malik PA-C.    Assessment/Plan   Allergies, medications, history, and pertinent labs/EKGs/Imaging reviewed by Kayla Malik PA-C.     Medical Decision Making  MDM- staple removal able to be completed without evidence of infection or wound dehiscence. Wound remains closed and well approximated following removal of sutures. See procedure note. Patient counseled on wound care at home and advised to return with any further concerns. Patient/family verbalized understanding and agree with plan.       Orders and Diagnoses  Diagnoses and all orders for this visit:  Visit for suture removal  Scalp laceration, initial encounter  Other orders  -     Suture Removal      Medical Admin Record      Patient disposition: Home    Electronically signed by Kayla Malik PA-C  2:07 PM

## 2025-01-29 ENCOUNTER — HOSPITAL ENCOUNTER (OUTPATIENT)
Dept: RADIOLOGY | Facility: CLINIC | Age: 55
Discharge: HOME | End: 2025-01-29
Payer: COMMERCIAL

## 2025-01-29 DIAGNOSIS — E78.5 HYPERLIPIDEMIA, UNSPECIFIED HYPERLIPIDEMIA TYPE: ICD-10-CM

## 2025-01-29 DIAGNOSIS — Z82.49 FAMILY HISTORY OF CORONARY ARTERY DISEASE: ICD-10-CM

## 2025-01-29 PROCEDURE — 75571 CT HRT W/O DYE W/CA TEST: CPT

## 2025-04-09 LAB
ANION GAP SERPL CALCULATED.4IONS-SCNC: 7 MMOL/L (CALC) (ref 7–17)
BUN SERPL-MCNC: 14 MG/DL (ref 7–25)
BUN/CREAT SERPL: ABNORMAL (CALC) (ref 6–22)
CALCIUM SERPL-MCNC: 9.6 MG/DL (ref 8.6–10.3)
CHLORIDE SERPL-SCNC: 102 MMOL/L (ref 98–110)
CHOLEST SERPL-MCNC: 197 MG/DL
CHOLEST/HDLC SERPL: 4.1 (CALC)
CO2 SERPL-SCNC: 29 MMOL/L (ref 20–32)
CREAT SERPL-MCNC: 1.29 MG/DL (ref 0.7–1.3)
EGFRCR SERPLBLD CKD-EPI 2021: 65 ML/MIN/1.73M2
GLUCOSE SERPL-MCNC: 104 MG/DL (ref 65–99)
HDLC SERPL-MCNC: 48 MG/DL
LDLC SERPL CALC-MCNC: 129 MG/DL (CALC)
NONHDLC SERPL-MCNC: 149 MG/DL (CALC)
POTASSIUM SERPL-SCNC: 4.9 MMOL/L (ref 3.5–5.3)
SODIUM SERPL-SCNC: 138 MMOL/L (ref 135–146)
TRIGL SERPL-MCNC: 97 MG/DL

## 2025-04-14 ENCOUNTER — APPOINTMENT (OUTPATIENT)
Dept: PRIMARY CARE | Facility: CLINIC | Age: 55
End: 2025-04-14
Payer: COMMERCIAL

## 2025-04-14 VITALS
OXYGEN SATURATION: 96 % | SYSTOLIC BLOOD PRESSURE: 98 MMHG | BODY MASS INDEX: 26.78 KG/M2 | WEIGHT: 184 LBS | TEMPERATURE: 98 F | HEART RATE: 73 BPM | DIASTOLIC BLOOD PRESSURE: 66 MMHG

## 2025-04-14 DIAGNOSIS — Z12.5 SCREENING FOR MALIGNANT NEOPLASM OF PROSTATE: ICD-10-CM

## 2025-04-14 DIAGNOSIS — R35.1 BPH ASSOCIATED WITH NOCTURIA: Primary | ICD-10-CM

## 2025-04-14 DIAGNOSIS — E78.5 HYPERLIPIDEMIA, UNSPECIFIED HYPERLIPIDEMIA TYPE: ICD-10-CM

## 2025-04-14 DIAGNOSIS — N40.1 BPH ASSOCIATED WITH NOCTURIA: Primary | ICD-10-CM

## 2025-04-14 PROCEDURE — 99213 OFFICE O/P EST LOW 20 MIN: CPT | Performed by: PHYSICIAN ASSISTANT

## 2025-04-14 PROCEDURE — 1036F TOBACCO NON-USER: CPT | Performed by: PHYSICIAN ASSISTANT

## 2025-04-14 RX ORDER — TAMSULOSIN HYDROCHLORIDE 0.4 MG/1
0.4 CAPSULE ORAL DAILY
Qty: 90 CAPSULE | Refills: 3 | Status: SHIPPED | OUTPATIENT
Start: 2025-04-14

## 2025-04-14 ASSESSMENT — ENCOUNTER SYMPTOMS: SHORTNESS OF BREATH: 0

## 2025-04-14 NOTE — PROGRESS NOTES
"Subjective   Patient ID: Deshawn Love is a 54 y.o. male who presents for Hyperlipidemia (Recheck. Review BW) and Benign Prostatic Hypertrophy.    HPI   Recheck of nocturia and BPH, and hyperlipidemia.     BPH/nocturia: Increased Flomax to 0.8mg every day after last visit and tolerating med well.   Nocturia didn't improve further on higher dose. Has good urinary stream.   Was having nocturia 2-3 times per night and now going mostly 0-1 times per night.        Lab Results   Component Value Date    PSA 1.60 07/08/2024         Hyperlipidemia: LDL samuel. Continued to minimize egg intake but did poorly with diet and exercise the past several months.  Snacking more.  Ct cardiac calcium score was 0 1/29/25.   LDL-CHOLESTEROL (mg/dL (calc))   Date Value   04/08/2025 129 (H)     LDL Calculated (mg/dL)   Date Value   10/02/2024 107 (H)   07/08/2024 127 (H)   No results found for: \"LDLF\"  TRIGLYCERIDES (mg/dL)   Date Value   04/08/2025 97     Triglycerides (mg/dL)   Date Value   10/02/2024 91   07/08/2024 105     HDL CHOLESTEROL (mg/dL)   Date Value   04/08/2025 48     HDL-Cholesterol (mg/dL)   Date Value   10/02/2024 47.6   07/08/2024 54.8       Renal function returned to normal on labs. Hydrating better with lab  Lab Results   Component Value Date    CREATININE 1.29 04/08/2025    CREATININE 1.31 (H) 10/02/2024    CREATININE 1.48 (H) 07/08/2024       Review of Systems   Respiratory:  Negative for shortness of breath.    Cardiovascular:  Negative for chest pain.       Past Medical History:   Diagnosis Date    Anxiety     Asthma     mild, intermittent (seasonal)    BPH associated with nocturia     Hyperlipidemia       Family History   Problem Relation Name Age of Onset    Diabetes type II Mother      Hypothyroidism Mother      Hypertension Father      Hypothyroidism Sister      Diabetes type II Brother      Alzheimer's disease Maternal Grandmother      Heart disease Maternal Grandfather      Heart disease Paternal " Grandmother      Diabetes Paternal Grandmother      Heart disease Paternal Grandfather        Social History     Tobacco Use    Smoking status: Never    Smokeless tobacco: Never   Vaping Use    Vaping status: Never Used   Substance Use Topics    Alcohol use: Yes     Alcohol/week: 3.0 standard drinks of alcohol     Types: 3 Shots of liquor per week    Drug use: Never          Objective   BP 98/66   Pulse 73   Temp 36.7 °C (98 °F)   Wt 83.5 kg (184 lb)   SpO2 96%   BMI 26.78 kg/m²     Physical Exam  Vitals and nursing note reviewed.   HENT:      Head: Normocephalic.   Eyes:      General: No scleral icterus.  Cardiovascular:      Rate and Rhythm: Normal rate and regular rhythm.   Pulmonary:      Effort: Pulmonary effort is normal.      Breath sounds: Normal breath sounds.   Abdominal:      Palpations: Abdomen is soft. There is no mass.      Tenderness: There is no abdominal tenderness.   Skin:     General: Skin is warm and dry.   Neurological:      Mental Status: He is alert.   Psychiatric:         Mood and Affect: Affect normal.       Assessment/Plan   Diagnoses and all orders for this visit:  BPH associated with nocturia  Hyperlipidemia, unspecified hyperlipidemia type  Screening for malignant neoplasm of prostate       Reviewed labs. Discussed diet and exercise.  Discussed risks of hyperlipidemia.  Refilled tamsulosin but reduce back to 0.4 mg dose.  Recheck BPH and hyperlipidemia in 1 year with fasting labs the week before.

## 2026-04-20 ENCOUNTER — APPOINTMENT (OUTPATIENT)
Dept: PRIMARY CARE | Facility: CLINIC | Age: 56
End: 2026-04-20
Payer: COMMERCIAL